# Patient Record
Sex: MALE | Race: BLACK OR AFRICAN AMERICAN | NOT HISPANIC OR LATINO | Employment: FULL TIME | URBAN - METROPOLITAN AREA
[De-identification: names, ages, dates, MRNs, and addresses within clinical notes are randomized per-mention and may not be internally consistent; named-entity substitution may affect disease eponyms.]

---

## 2024-08-17 ENCOUNTER — HOSPITAL ENCOUNTER (EMERGENCY)
Facility: HOSPITAL | Age: 24
Discharge: HOME | End: 2024-08-17
Attending: EMERGENCY MEDICINE
Payer: COMMERCIAL

## 2024-08-17 VITALS
WEIGHT: 165 LBS | OXYGEN SATURATION: 98 % | RESPIRATION RATE: 16 BRPM | TEMPERATURE: 97.5 F | BODY MASS INDEX: 25.9 KG/M2 | DIASTOLIC BLOOD PRESSURE: 58 MMHG | HEIGHT: 67 IN | HEART RATE: 62 BPM | SYSTOLIC BLOOD PRESSURE: 118 MMHG

## 2024-08-17 DIAGNOSIS — S01.512A LACERATION OF TONGUE, INITIAL ENCOUNTER: Primary | ICD-10-CM

## 2024-08-17 PROCEDURE — 99284 EMERGENCY DEPT VISIT MOD MDM: CPT | Performed by: EMERGENCY MEDICINE

## 2024-08-17 PROCEDURE — 2500000005 HC RX 250 GENERAL PHARMACY W/O HCPCS

## 2024-08-17 PROCEDURE — 41252 REPAIR TONGUE LACERATION: CPT | Performed by: EMERGENCY MEDICINE

## 2024-08-17 PROCEDURE — 41250 REPAIR TONGUE LACERATION: CPT

## 2024-08-17 PROCEDURE — 99283 EMERGENCY DEPT VISIT LOW MDM: CPT | Mod: 25

## 2024-08-17 RX ORDER — TRANEXAMIC ACID 100 MG/ML
1000 INJECTION, SOLUTION INTRAVENOUS ONCE
Status: COMPLETED | OUTPATIENT
Start: 2024-08-17 | End: 2024-08-17

## 2024-08-17 RX ORDER — LIDOCAINE HYDROCHLORIDE 10 MG/ML
5 INJECTION, SOLUTION EPIDURAL; INFILTRATION; INTRACAUDAL; PERINEURAL ONCE
Status: COMPLETED | OUTPATIENT
Start: 2024-08-17 | End: 2024-08-17

## 2024-08-17 RX ADMIN — TRANEXAMIC ACID 1000 MG: 1 INJECTION, SOLUTION INTRAVENOUS at 02:07

## 2024-08-17 RX ADMIN — LIDOCAINE HYDROCHLORIDE 50 MG: 10 INJECTION, SOLUTION EPIDURAL; INFILTRATION; INTRACAUDAL; PERINEURAL at 02:07

## 2024-08-17 ASSESSMENT — PAIN SCALES - GENERAL
PAINLEVEL_OUTOF10: 0 - NO PAIN
PAINLEVEL_OUTOF10: 6

## 2024-08-17 ASSESSMENT — PAIN DESCRIPTION - PAIN TYPE: TYPE: ACUTE PAIN

## 2024-08-17 ASSESSMENT — COLUMBIA-SUICIDE SEVERITY RATING SCALE - C-SSRS
1. IN THE PAST MONTH, HAVE YOU WISHED YOU WERE DEAD OR WISHED YOU COULD GO TO SLEEP AND NOT WAKE UP?: NO
6. HAVE YOU EVER DONE ANYTHING, STARTED TO DO ANYTHING, OR PREPARED TO DO ANYTHING TO END YOUR LIFE?: NO
2. HAVE YOU ACTUALLY HAD ANY THOUGHTS OF KILLING YOURSELF?: NO

## 2024-08-17 ASSESSMENT — LIFESTYLE VARIABLES
EVER FELT BAD OR GUILTY ABOUT YOUR DRINKING: NO
HAVE YOU EVER FELT YOU SHOULD CUT DOWN ON YOUR DRINKING: NO
HAVE PEOPLE ANNOYED YOU BY CRITICIZING YOUR DRINKING: NO
EVER HAD A DRINK FIRST THING IN THE MORNING TO STEADY YOUR NERVES TO GET RID OF A HANGOVER: NO
TOTAL SCORE: 0

## 2024-08-17 ASSESSMENT — PAIN DESCRIPTION - FREQUENCY: FREQUENCY: CONSTANT/CONTINUOUS

## 2024-08-17 ASSESSMENT — PAIN - FUNCTIONAL ASSESSMENT: PAIN_FUNCTIONAL_ASSESSMENT: 0-10

## 2024-08-17 NOTE — ED PROCEDURE NOTE
Procedure  Laceration Repair    Performed by: Aleksey Mathew DO  Authorized by: Ender Cui DO    Consent:     Consent obtained:  Verbal    Consent given by:  Patient    Risks discussed:  Infection, pain, poor cosmetic result, poor wound healing and need for additional repair    Alternatives discussed:  No treatment  Universal protocol:     Procedure explained and questions answered to patient or proxy's satisfaction: yes      Patient identity confirmed:  Verbally with patient  Anesthesia:     Anesthesia method:  Local infiltration    Local anesthetic:  Lidocaine 1% w/o epi  Laceration details:     Location:  Mouth    Mouth location:  Tongue, anterior 2/3    Length (cm):  4    Depth (mm):  5  Exploration:     Hemostasis achieved with:  Direct pressure    Wound exploration: wound explored through full range of motion    Skin repair:     Repair method:  Sutures    Suture size:  4-0    Suture material:  Chromic gut    Suture technique:  Simple interrupted    Number of sutures:  4  Approximation:     Approximation:  Close  Repair type:     Repair type:  Simple  Post-procedure details:     Dressing:  Open (no dressing)    Procedure completion:  Tolerated well, no immediate complications               Aleksey Mathew DO  Resident  08/17/24 4495      I was present during all critical and key portions of the procedure(s) and immediately available to furnish services the entire duration.  See resident note for details.                                    Ender Cui DO  08/18/24 4262

## 2024-08-17 NOTE — DISCHARGE INSTRUCTIONS
The sutures we placed in your cut are dissolvable, but if you do not come out on their own in 2 weeks, please go to your primary care doctor, urgent care, or return to the emergency department to have them taken out.  Do not smoke or chew tobacco while you have this cut.  Avoid foods that break apart easily, such as nuts and carbs.  You can still eat a proteins such meat.  Look out for signs of infection.  These include fever or chills, pus coming from the wound, with swelling and redness around the wound with increasing pain.  Return to the emergency department if you experience any of these signs or symptoms.  We have given you the phone number to a ENT doctor to follow-up with regarding your tongue laceration.

## 2024-08-17 NOTE — ED PROVIDER NOTES
EMERGENCY DEPARTMENT ENCOUNTER      Pt Name: Russel Shaw  MRN: [id number]  Birthdate 2000  Date of evaluation: 8/17/2024  Provider: Ender Cui DO    CHIEF COMPLAINT       Chief Complaint   Patient presents with    bit the tip of my tongue off while playing basket ball      Bit the tip off  of my tongue today while I was playing basketball.   It stopped bleeding earlier but but it started back up and it bleeding pretty good now. Other          HISTORY OF PRESENT ILLNESS    Patient is a 24-year-old male with no significant past medical history presenting to the ED with a tongue laceration.  He states that this occurred around 7:30 PM.  He was playing basketball when his friend hit his jaw causing him to bite down on his tongue.  He denied any loss of consciousness or falling and hitting his head.  He immediately had a copious amount of bleeding that he was then able to get under control for about 15 to 20 minutes.  And then started bleeding again and he has not been able to get it under control since.  He did try washing his mouth out with water as well.  He took 2 ibuprofen for pain management.  He is not on any blood thinners.  Denies any head or neck pain.  He does have a 6-pack-year smoking history.      History provided by:  Patient   used: No        Nursing Notes were reviewed.    PAST MEDICAL HISTORY     Past Medical History:   Diagnosis Date    Acute frontal sinusitis, unspecified 09/15/2017    Acute non-recurrent frontal sinusitis    Acute upper respiratory infection, unspecified 05/24/2016    Acute upper respiratory infection    Candidal esophagitis (Multi) 08/12/2014    Candida esophagitis    Concussion with loss of consciousness status unknown, initial encounter 05/17/2016    Concussion    Contusion of unspecified hand, initial encounter 11/17/2015    Hand contusion    Gastritis, unspecified, without bleeding     Helicobacter pylori gastritis    Local infection of  the skin and subcutaneous tissue, unspecified     Finger infection    Personal history of other diseases of the musculoskeletal system and connective tissue 04/27/2015    History of low back pain    Personal history of other diseases of the respiratory system 09/13/2020    History of pharyngitis    Personal history of other diseases of the respiratory system     History of sore throat    Personal history of other diseases of the respiratory system     History of sinusitis    Personal history of other diseases of the respiratory system     History of streptococcal pharyngitis    Personal history of other specified conditions 09/13/2020    History of diarrhea    Personal history of other specified conditions 08/08/2014    History of abdominal pain    Personal history of peptic ulcer disease 04/26/2022    History of gastric ulcer    Strain of muscle, fascia and tendon of lower back, initial encounter 09/28/2014    Lumbar strain         SURGICAL HISTORY       Past Surgical History:   Procedure Laterality Date    HERNIA REPAIR  04/26/2022    Hernia Repair         CURRENT MEDICATIONS       There are no discharge medications for this patient.      ALLERGIES     Shellfish derived    FAMILY HISTORY     No family history on file.       SOCIAL HISTORY       Social History     Socioeconomic History    Marital status: Single       SCREENINGS                        PHYSICAL EXAM    (up to 7 for level 4, 8 or more for level 5)     ED Triage Vitals [08/17/24 0054]   Temperature Heart Rate Respirations BP   36.4 °C (97.5 °F) 65 17 120/61      Pulse Ox Temp Source Heart Rate Source Patient Position   99 % Temporal Monitor Sitting      BP Location FiO2 (%)     Right arm --       Physical Exam  Vitals reviewed.   Constitutional:       General: He is not in acute distress.     Appearance: He is not ill-appearing or toxic-appearing.   HENT:      Head: Normocephalic and atraumatic.      Right Ear: External ear normal.      Left Ear:  External ear normal.      Nose: Nose normal.      Mouth/Throat:      Mouth: Mucous membranes are moist.      Tongue: Lesions (1 inch laceration to the R side of the tongue going all the way through both ventral and dorsal sides) present.     Eyes:      Conjunctiva/sclera: Conjunctivae normal.   Cardiovascular:      Rate and Rhythm: Normal rate.   Pulmonary:      Effort: Pulmonary effort is normal.   Abdominal:      General: Abdomen is flat.   Musculoskeletal:      Cervical back: Normal range of motion.   Skin:     General: Skin is warm and dry.   Neurological:      General: No focal deficit present.      Mental Status: He is alert.   Psychiatric:         Mood and Affect: Mood normal.         Behavior: Behavior normal.          DIAGNOSTIC RESULTS     LABS:  Labs Reviewed - No data to display    All other labs were within normal range or not returned as of this dictation.    Imaging  No orders to display        Procedures  Procedures     EMERGENCY DEPARTMENT COURSE/MDM:   Medical Decision Making  Patient is a 24-year-old male presenting to the ED with a tongue laceration.  States he bit his tongue while playing basketball around 730 today.  He was able to stop the bleeding for about 15 to 20 minutes before started again.  He has not been able to get the bleeding under control since.  Denies any blood thinner use.  Denies any loss of consciousness or hitting his head.  He did take 2 ibuprofen for pain management.  He is hemodynamically stable and vital signs are within normal limits.  On exam he does have a copious amount of bleeding from his tongue.  There appears to be about a 4 cm laceration to the R side of the tongue going through both the ventral and dorsal side of the tongue.  He otherwise has no pain.  No [place] achieved with 1% lidocaine without epinephrine.  Sutures were placed. Patient does not need to be admitted at this time as bleeding has stopped after sutures were placed and he has no other injuries  or complaints at this time.  He is instructed to stop smoking.  Care instructions were explained as well as return precautions. He was instructed to return to the ED if the sutures had not dissolved after 2 weeks. ENT referral was provided.  Home care and return instructions discussed. Patient expressed understanding and agreement. Patient discharged in stable condition.    Aleksey Mathew DO, PGY-4  Emergency Medicine Resident    Amount and/or Complexity of Data Reviewed  External Data Reviewed: notes.     Details: No recent PCP notes to review        Please see ED course for additional MDM.    Diagnoses as of 08/18/24 0533   Laceration of tongue, initial encounter        No orders to display       Patient and or family in agreement and understanding of treatment plan.  All questions answered.      I reviewed the case with the attending ED physician. The attending ED physician agrees with the plan. Patient and/or patient´s representative was counseled regarding labs, imaging, likely diagnosis, and plan. All questions were answered.    ED Medications administered this visit:    Medications   tranexamic acid (Cyklokapron) injection 1,000 mg (1,000 mg Topical Given 8/17/24 0207)   lidocaine PF (Xylocaine) 10 mg/mL (1 %) injection 50 mg (50 mg infiltration Given 8/17/24 0207)       New Prescriptions from this visit:    There are no discharge medications for this patient.      Follow-up:  Sherri Mars MD  [address]  [address]  [place] [address]  [phone]    In 1 week      Blayne Cabrera DO  [address] [place] [address]  [phone]    Schedule an appointment as soon as possible for a visit           Final Impression:   1. Laceration of tongue, initial encounter          (Please note that portions of this note were completed with a voice recognition program.  Efforts were made to edit the dictations but occasionally words are mis-transcribed.)         Aleksey Mathew DO  Resident  08/17/24  1606       Aleksey Mathew DO  Resident  08/17/24 1608  The patient was seen by the resident/fellow.  I have personally performed a substantive portion of the encounter.  I have seen and examined the patient; agree with the workup, evaluation, MDM, management and diagnosis.  The care plan has been discussed with the resident; I have reviewed the resident’s note and agree with the documented findings.                                   Ender Cui DO  08/18/24 0502